# Patient Record
Sex: FEMALE | Race: WHITE
[De-identification: names, ages, dates, MRNs, and addresses within clinical notes are randomized per-mention and may not be internally consistent; named-entity substitution may affect disease eponyms.]

---

## 2017-06-08 ENCOUNTER — HOSPITAL ENCOUNTER (EMERGENCY)
Dept: HOSPITAL 31 - C.ER | Age: 22
Discharge: HOME | End: 2017-06-08
Payer: MEDICAID

## 2017-06-08 VITALS — RESPIRATION RATE: 18 BRPM

## 2017-06-08 VITALS — HEART RATE: 76 BPM | SYSTOLIC BLOOD PRESSURE: 104 MMHG | DIASTOLIC BLOOD PRESSURE: 68 MMHG | TEMPERATURE: 98.3 F

## 2017-06-08 VITALS — OXYGEN SATURATION: 100 %

## 2017-06-08 VITALS — BODY MASS INDEX: 21.2 KG/M2

## 2017-06-08 DIAGNOSIS — R10.13: Primary | ICD-10-CM

## 2017-06-08 LAB
ALBUMIN/GLOB SERPL: 1.1 {RATIO} (ref 1–2.1)
ALP SERPL-CCNC: 54 U/L (ref 38–126)
ALT SERPL-CCNC: 45 U/L (ref 9–52)
AST SERPL-CCNC: 41 U/L (ref 14–36)
BASOPHILS # BLD AUTO: 0 K/UL (ref 0–0.2)
BASOPHILS NFR BLD: 0.5 % (ref 0–2)
BASOPHILS NFR BLD: 1 % (ref 0–2)
BILIRUB SERPL-MCNC: 0.4 MG/DL (ref 0.2–1.3)
BILIRUB UR-MCNC: NEGATIVE MG/DL
BUN SERPL-MCNC: 13 MG/DL (ref 7–17)
CALCIUM SERPL-MCNC: 8.2 MG/DL (ref 8.6–10.4)
CHLORIDE SERPL-SCNC: 102 MMOL/L (ref 98–107)
CO2 SERPL-SCNC: 26 MMOL/L (ref 22–30)
EOSINOPHIL # BLD AUTO: 0.1 K/UL (ref 0–0.7)
EOSINOPHIL NFR BLD: 0.7 % (ref 0–4)
EOSINOPHIL NFR BLD: 1 % (ref 0–4)
ERYTHROCYTE [DISTWIDTH] IN BLOOD BY AUTOMATED COUNT: 17.1 % (ref 11.5–14.5)
GLOBULIN SER-MCNC: 3.4 GM/DL (ref 2.2–3.9)
GLUCOSE SERPL-MCNC: 82 MG/DL (ref 65–105)
GLUCOSE UR STRIP-MCNC: NORMAL MG/DL
HCT VFR BLD CALC: 27.1 % (ref 34–47)
KETONES UR STRIP-MCNC: NEGATIVE MG/DL
LEUKOCYTE ESTERASE UR-ACNC: (no result) LEU/UL
LYMPHOCYTES # BLD AUTO: 0.7 K/UL (ref 1–4.3)
LYMPHOCYTES NFR BLD AUTO: 9.5 % (ref 20–40)
MCH RBC QN AUTO: 20.6 PG (ref 27–31)
MCHC RBC AUTO-ENTMCNC: 30.3 G/DL (ref 33–37)
MCV RBC AUTO: 68.1 FL (ref 81–99)
MONOCYTES # BLD: 1.2 K/UL (ref 0–0.8)
MONOCYTES NFR BLD: 15 % (ref 0–10)
NEUTROPHILS NFR BLD AUTO: 69 % (ref 50–75)
NRBC BLD AUTO-RTO: 0 % (ref 0–2)
PH UR STRIP: 7 [PH] (ref 5–8)
PLATELET # BLD: 292 K/UL (ref 130–400)
PMV BLD AUTO: 9 FL (ref 7.2–11.7)
POTASSIUM SERPL-SCNC: 3.8 MMOL/L (ref 3.6–5.2)
PROT SERPL-MCNC: 7.2 G/DL (ref 6.3–8.3)
PROT UR STRIP-MCNC: NEGATIVE MG/DL
RBC # UR STRIP: NEGATIVE /UL
RBC #/AREA URNS HPF: 3 /HPF (ref 0–3)
SODIUM SERPL-SCNC: 136 MMOL/L (ref 132–148)
SP GR UR STRIP: 1.02 (ref 1–1.03)
TOTAL CELLS COUNTED BLD: 100
UROBILINOGEN UR-MCNC: NORMAL MG/DL (ref 0.2–1)
WBC # BLD AUTO: 7.8 K/UL (ref 4.8–10.8)
WBC #/AREA URNS HPF: 3 /HPF (ref 0–5)

## 2017-06-08 PROCEDURE — 96361 HYDRATE IV INFUSION ADD-ON: CPT

## 2017-06-08 PROCEDURE — 99285 EMERGENCY DEPT VISIT HI MDM: CPT

## 2017-06-08 PROCEDURE — 86850 RBC ANTIBODY SCREEN: CPT

## 2017-06-08 PROCEDURE — 85025 COMPLETE CBC W/AUTO DIFF WBC: CPT

## 2017-06-08 PROCEDURE — 80053 COMPREHEN METABOLIC PANEL: CPT

## 2017-06-08 PROCEDURE — 82550 ASSAY OF CK (CPK): CPT

## 2017-06-08 PROCEDURE — 84484 ASSAY OF TROPONIN QUANT: CPT

## 2017-06-08 PROCEDURE — 71020: CPT

## 2017-06-08 PROCEDURE — 82553 CREATINE MB FRACTION: CPT

## 2017-06-08 PROCEDURE — 96374 THER/PROPH/DIAG INJ IV PUSH: CPT

## 2017-06-08 PROCEDURE — 76705 ECHO EXAM OF ABDOMEN: CPT

## 2017-06-08 PROCEDURE — 86900 BLOOD TYPING SEROLOGIC ABO: CPT

## 2017-06-08 PROCEDURE — 85378 FIBRIN DEGRADE SEMIQUANT: CPT

## 2017-06-08 PROCEDURE — 84703 CHORIONIC GONADOTROPIN ASSAY: CPT

## 2017-06-08 PROCEDURE — 81001 URINALYSIS AUTO W/SCOPE: CPT

## 2017-06-08 NOTE — C.PDOC
History Of Present Illness


21 yr old female presents to the ER stating she recently came back from 

vacation in [place] and has not been feeling well ever since. 

Patient reports of nausea, chest pain,  upper abdominal pain and weakness. 

Patient states she was fine while in [place]. Patient reports 

history of iron deficiency and is taking iron. Denies fever, chills, vomiting, 

diarrhea, constipation, neck pain, headache, weakness or numbness. 


Time Seen by Provider: 17 11:15


Chief Complaint (Nursing): Dizziness/Lightheaded


History Per: Patient


History/Exam Limitations: no limitations


Onset/Duration Of Symptoms: Days (3)





Past Medical History


Reviewed: Historical Data, Nursing Documentation, Vital Signs


Vital Signs: 


 Last Vital Signs











Temp  98.3 F   17 15:56


 


Pulse  76   17 15:56


 


Resp  18   17 15:56


 


BP  104/68   17 15:56


 


Pulse Ox  100   17 15:56














- Medical History


PMH: Anemia, Kidney Stones, Chronic Kidney Disease





- [facility] Procedures








PACKED CELL TRANSFUSION (14)








Family History: States: No Known Family Hx





- Social History


Hx Tobacco Use: No


Hx Alcohol Use: No


Hx Substance Use: No





- Immunization History


Hx Tetanus Toxoid Vaccination: Yes


Hx Influenza Vaccination: Yes


Hx Pneumococcal Vaccination: No





Review Of Systems


Except As Marked, All Systems Reviewed And Found Negative.


Constitutional: Positive for: Weakness.  Negative for: Fever, Chills


Cardiovascular: Positive for: Chest Pain


Gastrointestinal: Positive for: Nausea, Abdominal Pain (Upper abdomin pain ).  

Negative for: Vomiting, Constipation


Musculoskeletal: Negative for: Neck Pain


Neurological: Negative for: Weakness, Numbness, Headache





Physical Exam





- Physical Exam


Appears: Well, Non-toxic, No Acute Distress


Skin: Warm, Dry, No Rash


Head: Atraumatic, Normacephalic


Oral Mucosa: Moist


Chest: Symmetrical, No Tenderness


Cardiovascular: Rhythm Regular, No Murmur


Respiratory: Normal Breath Sounds, No Rales, No Wheezing


Gastrointestinal/Abdominal: Soft, Tenderness (Epigastric tenderness ), No 

Guarding, No Rebound


Extremity: Normal ROM, No Swelling





ED Course And Treatment





- Laboratory Results


Result Diagrams: 


 17 11:58





 17 11:58


ECG: Interpreted By Me, Viewed By Me


ECG Rhythm: Sinus Rhythm


ECG Interpretation: Normal


Rate From EC (BPM)


O2 Sat by Pulse Oximetry: 100 (RA)


Pulse Ox Interpretation: Normal





- Other Rad


  ** CXR


X-Ray: Viewed By Me, Read By Radiologist


Interpretation: HISTORY:  chest pain.  COMPARISON:  Chest x-ray performed .  TECHNIQUE:  Chest PA and lateral.  FINDINGS:  LUNGS:  No focal 

consolidation.  Please note that chest x-ray has limited sensitivity for the 

detection of pulmonary masses.  PLEURA:  No significant pleural effusion 

identified. No definite pneumothorax .  CARDIOVASCULAR:  The cardiomediastinal 

silhouette appears within normal limits of size.  OSSEOUS STRUCTURES:  No acute 

osseous abnormality identified.  VISUALIZED UPPER ABDOMEN:  Unremarkable.  

OTHER FINDINGS:  None.  IMPRESSION:  No focal consolidation, significant 

pleural effusion, or definite pneumothorax identified.





- CT Scan/US


  ** US - Abdomen 


Other Rad Studies (CT/US): Read By Radiologist, Radiology Report Reviewed


CT/US Interpretation: HISTORY:  upper abdominal pain.  COMPARISON:  Abdominal 

ultrasound performed 17.  TECHNIQUE:  Sonographic evaluation of the right 

upper quadrant of the abdomen.  FINDINGS:  LIVER:  Measures 16.9 cm in length 

and appears within normal limits of shape, size, and echotexture.  No focal 

hepatic mass identified. The main portal vein appears patent with normal 

directional flow. No intrahepatic bile duct dilatation.  GALLBLADDER:  No 

gallstones. No gallbladder wall thickening or pericholecystic edema identified. 

Negative sonographic Butcher's sign as assessed by the sonographer.  COMMON BILE 

DUCT:  Measures 3 mm.  PANCREAS:  Not well-visualized.  RIGHT KIDNEY:  Measures 

12.7 x 4.0 x 6.9 cm. No obstructing calculus or hydronephrosis identified.  

AORTA:  Limited visualization appears grossly unremarkable.  IVC:  Limited 

visualization appears grossly unremarkable.  OTHER FINDINGS:  None .  IMPRESSION

:  No acute findings.  See above.





Medical Decision Making


Medical Decision Making: 


PLAN:


* US - Abdomen 


* CXR


* EKG 


* CBC 


* CMP


* D-Dimer


* Troponin 


* HCG 


* Urinalysis 


* Sodium Chloride IV 


* 


* 


* On re-exam patient feels better, tolerates po and is stable to be d/c home 

with PMD follow up. Copies of all labs and radiology reports were given to the 

patient.





Disposition





- Disposition


Disposition: HOME/ ROUTINE


Disposition Time: 15:43


Condition: STABLE


Additional Instructions: 


Follow up with PMD within 1-2 days. Return to ED if feel worse.


Prescriptions: 


Pantoprazole Sodium [Protonix] 40 mg PO QAM #30 ect


Instructions:  Epigastric Pain (ED)





- Clinical Impression


Clinical Impression: 


 Epigastric pain








- PA / NP / Resident Statement


MD/DO has reviewed & agrees with the documentation as recorded.





- Scribe Statement


The provider has reviewed the documentation as recorded by the Scribe


Gabrielle Preston





All medical record entries made by the Scribe were at my direction and 

personally dictated by me. I have reviewed the chart and agree that the record 

accurately reflects my personal performance of the history, physical exam, 

medical decision making, and the department course for this patient. I have 

also personally directed, reviewed, and agree with the discharge instructions 

and disposition.

## 2017-06-08 NOTE — RAD
HISTORY:

chest pain  



COMPARISON:

Chest x-ray performed 2/4/17 



TECHNIQUE:

Chest PA and lateral



FINDINGS:





LUNGS:

No focal consolidation.



Please note that chest x-ray has limited sensitivity for the 

detection of pulmonary masses.



PLEURA:

No significant pleural effusion identified. No definite pneumothorax .



CARDIOVASCULAR:

The cardiomediastinal silhouette appears within normal limits of 

size. 



OSSEOUS STRUCTURES:

 No acute osseous abnormality identified.



VISUALIZED UPPER ABDOMEN:

Unremarkable.



OTHER FINDINGS:

None.



IMPRESSION:

No focal consolidation, significant pleural effusion, or definite 

pneumothorax identified.

## 2017-06-08 NOTE — US
HISTORY:

upper abdominal pain



COMPARISON:

Abdominal ultrasound performed 2/4/17



TECHNIQUE:

Sonographic evaluation of the right upper quadrant of the abdomen.



FINDINGS:



LIVER:

Measures 16.9 cm in length and appears within normal limits of shape, 

size, and echotexture.  No focal hepatic mass identified. The main 

portal vein appears patent with normal directional flow. No 

intrahepatic bile duct dilatation. 



GALLBLADDER:

No gallstones. No gallbladder wall thickening or pericholecystic 

edema identified. Negative sonographic Butcher's sign as assessed by 

the sonographer. 



COMMON BILE DUCT:

Measures 3 mm. 



PANCREAS:

Not well-visualized.



RIGHT KIDNEY:

Measures 12.7 x 4.0 x 6.9 cm. No obstructing calculus or 

hydronephrosis identified. 



AORTA:

Limited visualization appears grossly unremarkable.



IVC:

Limited visualization appears grossly unremarkable.



OTHER FINDINGS:

None .



IMPRESSION:

No acute findings.  See above.

## 2017-06-12 ENCOUNTER — HOSPITAL ENCOUNTER (EMERGENCY)
Dept: HOSPITAL 31 - C.ER | Age: 22
Discharge: HOME | End: 2017-06-12
Payer: MEDICAID

## 2017-06-12 VITALS — BODY MASS INDEX: 22 KG/M2

## 2017-06-12 VITALS
TEMPERATURE: 98.1 F | SYSTOLIC BLOOD PRESSURE: 105 MMHG | OXYGEN SATURATION: 97 % | DIASTOLIC BLOOD PRESSURE: 66 MMHG | HEART RATE: 77 BPM

## 2017-06-12 VITALS — RESPIRATION RATE: 20 BRPM

## 2017-06-12 DIAGNOSIS — R10.13: Primary | ICD-10-CM

## 2017-06-12 DIAGNOSIS — R11.2: ICD-10-CM

## 2017-06-12 DIAGNOSIS — R19.7: ICD-10-CM

## 2017-06-12 LAB
ALBUMIN/GLOB SERPL: 1.1 {RATIO} (ref 1–2.1)
ALP SERPL-CCNC: 50 U/L (ref 38–126)
ALT SERPL-CCNC: 38 U/L (ref 9–52)
AST SERPL-CCNC: 38 U/L (ref 14–36)
BACTERIA #/AREA URNS HPF: (no result) /[HPF]
BASOPHILS # BLD AUTO: 0 K/UL (ref 0–0.2)
BASOPHILS NFR BLD: 0.8 % (ref 0–2)
BILIRUB SERPL-MCNC: 0.4 MG/DL (ref 0.2–1.3)
BILIRUB UR-MCNC: NEGATIVE MG/DL
BUN SERPL-MCNC: 11 MG/DL (ref 7–17)
CALCIUM SERPL-MCNC: 8.6 MG/DL (ref 8.6–10.4)
CHLORIDE SERPL-SCNC: 103 MMOL/L (ref 98–107)
CO2 SERPL-SCNC: 23 MMOL/L (ref 22–30)
EOSINOPHIL # BLD AUTO: 0 K/UL (ref 0–0.7)
EOSINOPHIL NFR BLD: 0.3 % (ref 0–4)
ERYTHROCYTE [DISTWIDTH] IN BLOOD BY AUTOMATED COUNT: 17.1 % (ref 11.5–14.5)
GLOBULIN SER-MCNC: 3.8 GM/DL (ref 2.2–3.9)
GLUCOSE SERPL-MCNC: 76 MG/DL (ref 65–105)
GLUCOSE UR STRIP-MCNC: NORMAL MG/DL
HCT VFR BLD CALC: 30.1 % (ref 34–47)
KETONES UR STRIP-MCNC: NEGATIVE MG/DL
LEUKOCYTE ESTERASE UR-ACNC: (no result) LEU/UL
LYMPHOCYTES # BLD AUTO: 1.2 K/UL (ref 1–4.3)
LYMPHOCYTES NFR BLD AUTO: 21.6 % (ref 20–40)
MCH RBC QN AUTO: 20.6 PG (ref 27–31)
MCHC RBC AUTO-ENTMCNC: 30.2 G/DL (ref 33–37)
MCV RBC AUTO: 68.2 FL (ref 81–99)
MONOCYTES # BLD: 1.2 K/UL (ref 0–0.8)
MONOCYTES NFR BLD: 21.9 % (ref 0–10)
NEUTROPHILS NFR BLD AUTO: 68 % (ref 50–75)
NRBC BLD AUTO-RTO: 0.1 % (ref 0–2)
PH UR STRIP: 6 [PH] (ref 5–8)
PLATELET # BLD: 237 K/UL (ref 130–400)
PMV BLD AUTO: 9.3 FL (ref 7.2–11.7)
POTASSIUM SERPL-SCNC: 4 MMOL/L (ref 3.6–5.2)
PROT SERPL-MCNC: 8 G/DL (ref 6.3–8.3)
PROT UR STRIP-MCNC: (no result) MG/DL
RBC # UR STRIP: (no result) /UL
RBC #/AREA URNS HPF: 11 /HPF (ref 0–3)
SODIUM SERPL-SCNC: 136 MMOL/L (ref 132–148)
SP GR UR STRIP: 1.03 (ref 1–1.03)
TOTAL CELLS COUNTED BLD: 100
UROBILINOGEN UR-MCNC: NORMAL MG/DL (ref 0.2–1)
WBC # BLD AUTO: 5.4 K/UL (ref 4.8–10.8)
WBC #/AREA URNS HPF: < 1 /HPF (ref 0–5)

## 2017-06-12 PROCEDURE — 81001 URINALYSIS AUTO W/SCOPE: CPT

## 2017-06-12 PROCEDURE — 83690 ASSAY OF LIPASE: CPT

## 2017-06-12 PROCEDURE — 80053 COMPREHEN METABOLIC PANEL: CPT

## 2017-06-12 PROCEDURE — 84703 CHORIONIC GONADOTROPIN ASSAY: CPT

## 2017-06-12 PROCEDURE — 96361 HYDRATE IV INFUSION ADD-ON: CPT

## 2017-06-12 PROCEDURE — 96375 TX/PRO/DX INJ NEW DRUG ADDON: CPT

## 2017-06-12 PROCEDURE — 99284 EMERGENCY DEPT VISIT MOD MDM: CPT

## 2017-06-12 PROCEDURE — 85025 COMPLETE CBC W/AUTO DIFF WBC: CPT

## 2017-06-12 PROCEDURE — 87804 INFLUENZA ASSAY W/OPTIC: CPT

## 2017-06-12 PROCEDURE — 96374 THER/PROPH/DIAG INJ IV PUSH: CPT

## 2017-07-08 ENCOUNTER — HOSPITAL ENCOUNTER (OUTPATIENT)
Dept: HOSPITAL 31 - C.ER | Age: 22
Setting detail: OBSERVATION
LOS: 1 days | Discharge: HOME | End: 2017-07-09
Attending: SPECIALIST | Admitting: SPECIALIST
Payer: MEDICAID

## 2017-07-08 VITALS — RESPIRATION RATE: 20 BRPM

## 2017-07-08 VITALS — BODY MASS INDEX: 22 KG/M2

## 2017-07-08 DIAGNOSIS — E03.9: ICD-10-CM

## 2017-07-08 DIAGNOSIS — D64.9: Primary | ICD-10-CM

## 2017-07-08 DIAGNOSIS — R42: ICD-10-CM

## 2017-07-08 DIAGNOSIS — Z87.442: ICD-10-CM

## 2017-07-08 DIAGNOSIS — N18.9: ICD-10-CM

## 2017-07-08 LAB
ALBUMIN SERPL-MCNC: 4 G/DL (ref 3.5–5)
ALBUMIN/GLOB SERPL: 1.1 {RATIO} (ref 1–2.1)
ALT SERPL-CCNC: 20 U/L (ref 9–52)
APTT BLD: 29 SECONDS (ref 21–34)
AST SERPL-CCNC: 23 U/L (ref 14–36)
BASOPHILS # BLD AUTO: 0 K/UL (ref 0–0.2)
BASOPHILS NFR BLD: 0.6 % (ref 0–2)
BILIRUB UR-MCNC: NEGATIVE MG/DL
BUN SERPL-MCNC: 13 MG/DL (ref 7–17)
CALCIUM SERPL-MCNC: 8.9 MG/DL (ref 8.6–10.4)
EOSINOPHIL # BLD AUTO: 0 K/UL (ref 0–0.7)
EOSINOPHIL NFR BLD: 0.5 % (ref 0–4)
ERYTHROCYTE [DISTWIDTH] IN BLOOD BY AUTOMATED COUNT: 17.8 % (ref 11.5–14.5)
GFR NON-AFRICAN AMERICAN: > 60
GLUCOSE UR STRIP-MCNC: NORMAL MG/DL
HCG,QUALITATIVE URINE: NEGATIVE
HGB BLD-MCNC: 8.5 G/DL (ref 11–16)
INR PPP: 1.1
LEUKOCYTE ESTERASE UR-ACNC: (no result) LEU/UL
LYMPHOCYTES # BLD AUTO: 1 K/UL (ref 1–4.3)
LYMPHOCYTES NFR BLD AUTO: 16.4 % (ref 20–40)
MCH RBC QN AUTO: 20.6 PG (ref 27–31)
MCHC RBC AUTO-ENTMCNC: 30.4 G/DL (ref 33–37)
MCV RBC AUTO: 67.8 FL (ref 81–99)
MONOCYTES # BLD: 0.6 K/UL (ref 0–0.8)
MONOCYTES NFR BLD: 9 % (ref 0–10)
NEUTROPHILS # BLD: 4.5 K/UL (ref 1.8–7)
NEUTROPHILS NFR BLD AUTO: 73.5 % (ref 50–75)
NRBC BLD AUTO-RTO: 0 % (ref 0–2)
PH UR STRIP: 5 [PH] (ref 5–8)
PLATELET # BLD: 312 K/UL (ref 130–400)
PMV BLD AUTO: 8.8 FL (ref 7.2–11.7)
PROT UR STRIP-MCNC: NEGATIVE MG/DL
PROTHROMBIN TIME: 12.5 SECONDS (ref 9.7–12.2)
RBC # BLD AUTO: 4.1 MIL/UL (ref 3.8–5.2)
RBC # UR STRIP: (no result) /UL
SP GR UR STRIP: 1.03 (ref 1–1.03)
SQUAMOUS EPITHIAL: 5 /HPF (ref 0–5)
URINE NITRATE: NEGATIVE
UROBILINOGEN UR-MCNC: NORMAL MG/DL (ref 0.2–1)
WBC # BLD AUTO: 6.1 K/UL (ref 4.8–10.8)

## 2017-07-08 PROCEDURE — 36430 TRANSFUSION BLD/BLD COMPNT: CPT

## 2017-07-08 PROCEDURE — 81001 URINALYSIS AUTO W/SCOPE: CPT

## 2017-07-08 PROCEDURE — 76830 TRANSVAGINAL US NON-OB: CPT

## 2017-07-08 PROCEDURE — 86922 COMPATIBILITY TEST ANTIGLOB: CPT

## 2017-07-08 PROCEDURE — 85730 THROMBOPLASTIN TIME PARTIAL: CPT

## 2017-07-08 PROCEDURE — 71010: CPT

## 2017-07-08 PROCEDURE — 85025 COMPLETE CBC W/AUTO DIFF WBC: CPT

## 2017-07-08 PROCEDURE — 80053 COMPREHEN METABOLIC PANEL: CPT

## 2017-07-08 PROCEDURE — 82948 REAGENT STRIP/BLOOD GLUCOSE: CPT

## 2017-07-08 PROCEDURE — 86900 BLOOD TYPING SEROLOGIC ABO: CPT

## 2017-07-08 PROCEDURE — 86920 COMPATIBILITY TEST SPIN: CPT

## 2017-07-08 PROCEDURE — 86850 RBC ANTIBODY SCREEN: CPT

## 2017-07-08 PROCEDURE — 84703 CHORIONIC GONADOTROPIN ASSAY: CPT

## 2017-07-08 PROCEDURE — 99285 EMERGENCY DEPT VISIT HI MDM: CPT

## 2017-07-08 PROCEDURE — 86905 BLOOD TYPING RBC ANTIGENS: CPT

## 2017-07-08 PROCEDURE — 36415 COLL VENOUS BLD VENIPUNCTURE: CPT

## 2017-07-08 PROCEDURE — 80048 BASIC METABOLIC PNL TOTAL CA: CPT

## 2017-07-08 PROCEDURE — 85610 PROTHROMBIN TIME: CPT

## 2017-07-08 PROCEDURE — 76856 US EXAM PELVIC COMPLETE: CPT

## 2017-07-08 NOTE — CP.PCM.HP
History of Present Illness





- History of Present Illness


History of Present Illness: 





21 yr old female with PMHx of hypothyroidism, presents to the ER with 

complaints of dizziness and weakness, mild headache for the past few days. 

Patient reports she also has palpitations Patient denies recent travel, fever, 

vision changes, chest pain, SOB, nausea, vomiting, weakness or numbness. 





Present on Admission





- Present on Admission


History of DVT/PE: No


History of Uncontrolled Diabetes: No


Urinary Catheter: No


Decubitus Ulcer Present: No





Review of Systems





- Constitutional


Constitutional: As Per HPI





- EENT


Eyes: As Per HPI





Past Patient History





- Past Medical History & Family History


Past Medical History?: Yes


Past Family History: Reviewed and not pertinent





- Past Social History


Smoking Status: Never Smoked


Chewing Tobacco Use: No


Alcohol: None


Home Situation {Lives}: With Family





- CARDIAC


Hx Cardiac Disorders: No


Hx Congestive Heart Failure: No





- PULMONARY


Hx Respiratory Disorders: No





- NEUROLOGICAL


Hx Neurological Disorder: No





- HEENT


Hx HEENT Problems: No





- RENAL


Hx Chronic Kidney Disease: No


Hx Dialysis: No


Hx Kidney Stones: No





- ENDOCRINE/METABOLIC


Hx Endocrine Disorders: No





- HEMATOLOGICAL/ONCOLOGICAL


Hx Anemia: Yes





- INTEGUMENTARY


Hx Dermatological Problems: No





- MUSCULOSKELETAL/RHEUMATOLOGICAL


Hx Musculoskeletal Disorders: No


Hx Falls: No





- GASTROINTESTINAL


Hx Gastrointestinal Disorders: No





- GENITOURINARY/GYNECOLOGICAL


Hx Genitourinary Disorders: No





- PSYCHIATRIC


Hx Psychophysiologic Disorder: No


Hx Substance Use: No





- SURGICAL HISTORY


Hx Surgeries: No





- ANESTHESIA


Hx Anesthesia: No


Hx Anesthesia Reactions: No





Meds


Allergies/Adverse Reactions: 


 Allergies











Allergy/AdvReac Type Severity Reaction Status Date / Time


 


aspirin Allergy Severe RASH Verified 06/12/17 17:15


 


sodium ferric gluconate Allergy Severe SWELLING Verified 06/12/17 17:15





complex     





[From Ferrlecit]     


 


sucrose [From Ferrlecit] Allergy Severe SWELLING Verified 06/12/17 17:15


 


morphine AdvReac Severe HEADACHE Verified 06/12/17 17:15


 


metoclopramide [From Reglan] AdvReac Unknown  Verified 07/08/17 12:43














Physical Exam





- Head Exam


Head Exam: ATRAUMATIC, NORMAL INSPECTION, NORMOCEPHALIC





- Eye Exam


Eye Exam: EOMI, Normal appearance, PERRL


Pupil Exam: NORMAL ACCOMODATION, PERRL





Results





- Vital Signs


Recent Vital Signs: 





 Last Vital Signs











Temp  98.3 F   07/08/17 17:22


 


Pulse  65   07/08/17 17:22


 


Resp  18   07/08/17 17:22


 


BP  110/68   07/08/17 17:22


 


Pulse Ox  100   07/08/17 17:22














- Labs


Result Diagrams: 


 07/09/17 07:40





 07/09/17 07:40





Assessment & Plan


(1) Anemia


Assessment and Plan: 


will transfuse 1 unit of PRBC and repeat labs in am 


Status: Acute   





- Date & Time


Date: 07/08/17


Time: 13:00

## 2017-07-08 NOTE — C.PDOC
History Of Present Illness


21 yr old female with PMHx of hypothyroidism, presents to the ER with 

complaints of dizziness as in weakness and mild headache for the past few days. 

Patient reports she was seen in ER last week for mild anemia. Patient also 

reports at times she also has palpitations. Patient denies recent travel, fever

, vision changes, chest pain, SOB, nausea, vomiting, weakness or numbness. 


Time Seen by Provider: 17 13:02


Chief Complaint (Nursing): Dizziness/Lightheaded


History Per: Patient


History/Exam Limitations: no limitations


Onset/Duration Of Symptoms: Days (Few days)





Past Medical History


Reviewed: Historical Data, Nursing Documentation, Vital Signs


Vital Signs: 


 Last Vital Signs











Temp  98.6 F   17 12:38


 


Pulse  71   17 12:38


 


Resp  18   17 12:38


 


BP  124/72   17 12:38


 


Pulse Ox  100   17 14:37














- Medical History


PMH: Anemia, Kidney Stones, Chronic Kidney Disease





- [facility] Procedures








PACKED CELL TRANSFUSION (14)








Family History: States: No Known Family Hx





- Social History


Hx Tobacco Use: No


Hx Alcohol Use: No


Hx Substance Use: No





- Immunization History


Hx Tetanus Toxoid Vaccination: Yes


Hx Influenza Vaccination: Yes


Hx Pneumococcal Vaccination: No





Review Of Systems


Except As Marked, All Systems Reviewed And Found Negative.


Constitutional: Positive for: Weakness.  Negative for: Fever


Eyes: Negative for: Vision Change


Cardiovascular: Positive for: Palpitations (Occasionally ).  Negative for: 

Chest Pain


Gastrointestinal: Negative for: Nausea, Vomiting


Neurological: Positive for: Headache (Mild), Dizziness.  Negative for: Weakness

, Numbness





Physical Exam





- Physical Exam


Appears: Non-toxic, No Acute Distress


Skin: Warm, Dry, Pale, No Rash


Head: Atraumatic, Normacephalic


Eye(s): bilateral: Normal Inspection, PERRL, EOMI


Oral Mucosa: Moist


Chest: Symmetrical, No Tenderness


Cardiovascular: Rhythm Regular, No Murmur


Respiratory: Normal Breath Sounds, No Rales, No Rhonchi, No Stridor, No Wheezing


Gastrointestinal/Abdominal: Normal Exam, Soft, No Tenderness, No Guarding, No 

Rebound


Extremity: Normal ROM, No Swelling


Neurological/Psych: Oriented x3, Normal Speech, Normal Cranial Nerves, Normal 

Motor





ED Course And Treatment





- Laboratory Results


Result Diagrams: 


 17 13:37





 17 13:37


ECG: Interpreted By Me, Viewed By Me


ECG Rhythm: Sinus Rhythm


ECG Interpretation: Normal


Interpretation Of ECG: No ST/T wave changes.


Rate From EC (BPM)


O2 Sat by Pulse Oximetry: 100 (RA)


Pulse Ox Interpretation: Normal





Medical Decision Making


Medical Decision Making: 


r/o anemia, other infectious metabolic etiology





PLAN:


* CXR


* EKG


* CBC


* CMP


* HCG


* Urinalysis 


230: pt with known anemia, now worse than baseline. pt was advised to return to 

er as she is symptomatic. pt c/o of palpitations, dizziness, wekaness. will 

transfuse. dr wen accepts. pt agrees to transfusion. pt denies gi bleeding

, states "she used ot get heavy periods" and has a "family history of anemiaa"





300: dr wen bedside. accepts pt





Disposition





- Disposition


Disposition: HOSPITALIZED


Disposition Time: 14:34


Condition: STABLE





- Clinical Impression


Clinical Impression: 


 Symptomatic anemia








- Scribe Statement


The provider has reviewed the documentation as recorded by the Ludmila Preston


Provider Attestation: 


All medical record entries made by the Isabellaibfrancisco were at my direction and 

personally dictated by me. I have reviewed the chart and agree that the record 

accurately reflects my personal performance of the history, physical exam, 

medical decision making, and the department course for this patient. I have 

also personally directed, reviewed, and agree with the discharge instructions 

and disposition.





Decision To Admit





- Pt Status Changed To:


Hospital Disposition Of: Observation





- .


Bed Request Type: Regular


Admitting Physician: Eulogio Wen


Patient Diagnosis: 


 Symptomatic anemia

## 2017-07-09 VITALS
HEART RATE: 70 BPM | SYSTOLIC BLOOD PRESSURE: 104 MMHG | TEMPERATURE: 98.4 F | DIASTOLIC BLOOD PRESSURE: 63 MMHG | OXYGEN SATURATION: 99 %

## 2017-07-09 LAB
BASOPHILS # BLD AUTO: 0 K/UL (ref 0–0.2)
BASOPHILS NFR BLD: 0.7 % (ref 0–2)
BUN SERPL-MCNC: 14 MG/DL (ref 7–17)
CALCIUM SERPL-MCNC: 8.8 MG/DL (ref 8.6–10.4)
EOSINOPHIL # BLD AUTO: 0.1 K/UL (ref 0–0.7)
EOSINOPHIL NFR BLD: 1.7 % (ref 0–4)
ERYTHROCYTE [DISTWIDTH] IN BLOOD BY AUTOMATED COUNT: 19.4 % (ref 11.5–14.5)
GFR NON-AFRICAN AMERICAN: > 60
HGB BLD-MCNC: 9 G/DL (ref 11–16)
LYMPHOCYTES # BLD AUTO: 1.6 K/UL (ref 1–4.3)
LYMPHOCYTES NFR BLD AUTO: 24.9 % (ref 20–40)
MCH RBC QN AUTO: 22.4 PG (ref 27–31)
MCHC RBC AUTO-ENTMCNC: 31.4 G/DL (ref 33–37)
MCV RBC AUTO: 71.3 FL (ref 81–99)
MONOCYTES # BLD: 0.8 K/UL (ref 0–0.8)
MONOCYTES NFR BLD: 13.4 % (ref 0–10)
NEUTROPHILS # BLD: 3.8 K/UL (ref 1.8–7)
NEUTROPHILS NFR BLD AUTO: 59.3 % (ref 50–75)
NRBC BLD AUTO-RTO: 0 % (ref 0–2)
PLATELET # BLD: 267 K/UL (ref 130–400)
PMV BLD AUTO: 9.3 FL (ref 7.2–11.7)
RBC # BLD AUTO: 4 MIL/UL (ref 3.8–5.2)
WBC # BLD AUTO: 6.3 K/UL (ref 4.8–10.8)

## 2017-07-09 NOTE — US
Pelvic ultrasound 



History: Pelvic pain. 



Comparison: None available. 



Technique: Real-time sonography was performed through the pelvis 

utilizing transabdominal and transvaginal techniques. 



Findings: 



Uterus: 7.3 x 3.9 x 4.3 centimeters.  Heterogeneous echotexture.  

Anteverted. 



Endometrium measures 6 millimeters, within normal limits. 



Small amount of free fluid within the pelvic cul-de-sac. 



Right ovary: 2.9 x 1.8 x 2.6 centimeters. Normal flow. 



Left ovary: 3.1 x 1.2 x 2.4 centimeters. Normal flow. 



Impression: 



Small amount of free fluid within the pelvic cul-de-sac. 



Otherwise unremarkable sonographic evaluation of the pelvis.

## 2017-07-09 NOTE — CP.PCM.PN
Subjective





- Date & Time of Evaluation


Date of Evaluation: 07/09/17


Time of Evaluation: 13:20





- Subjective


Subjective: 





oatient  sen and examined .  Patient denie  SOB CP Palpitation   - dizziness. 





Objective





- Vital Signs/Intake and Output


Vital Signs (last 24 hours): 


 











Temp Pulse Resp BP Pulse Ox


 


 98.4 F   70   20   104/63   99 


 


 07/09/17 00:00  07/09/17 00:00  07/09/17 00:00  07/09/17 00:00  07/09/17 00:00








Intake and Output: 


 











 07/09/17 07/09/17





 06:59 18:59


 


Intake Total 1010 


 


Balance 1010 














- Medications


Medications: 


 Current Medications





Levothyroxine Sodium (Synthroid)  50 mcg PO DAILY@0630 [facility]


   Last Admin: 07/09/17 06:02 Dose:  50 mcg











- Labs


Labs: 


 





 07/09/17 07:40 





 07/09/17 07:40 





 











PT  12.5 SECONDS (9.7-12.2)  H  07/08/17  13:37    


 


INR  1.1   07/08/17  13:37    


 


APTT  29 SECONDS (21-34)   07/08/17  13:37    














- Constitutional


Appears: Well





- Head Exam


Head Exam: NORMAL INSPECTION





- Eye Exam


Eye Exam: Normal appearance





- ENT Exam


ENT Exam: Normal Exam





- Neck Exam


Neck Exam: Full ROM





- Respiratory Exam


Respiratory Exam: Clear to Ausculation Bilateral, NORMAL BREATHING PATTERN





- Cardiovascular Exam


Cardiovascular Exam: REGULAR RHYTHM





- GI/Abdominal Exam


GI & Abdominal Exam: Normal Bowel Sounds





- Rectal Exam


Rectal Exam: Deferred





- Extremities Exam


Extremities Exam: Full ROM





- Back Exam


Back Exam: NORMAL INSPECTION





- Neurological Exam


Neurological Exam: Normal Gait (patient  will be discharged  today FU in office 

in 1 week)





Assessment and Plan


(1) Anemia


Status: Acute

## 2017-07-12 NOTE — CARD
--------------- APPROVED REPORT --------------





EKG Measurement

Heart [id number]

MI [id number]

[id number]

[id number]

[id number]



<Conclusion>

Normal sinus rhythm

Normal ECG

## 2018-03-09 ENCOUNTER — HOSPITAL ENCOUNTER (EMERGENCY)
Dept: HOSPITAL 31 - C.ER | Age: 23
LOS: 1 days | Discharge: HOME | End: 2018-03-10
Payer: MEDICAID

## 2018-03-09 VITALS
OXYGEN SATURATION: 98 % | SYSTOLIC BLOOD PRESSURE: 136 MMHG | HEART RATE: 78 BPM | TEMPERATURE: 98.2 F | DIASTOLIC BLOOD PRESSURE: 69 MMHG | RESPIRATION RATE: 14 BRPM

## 2018-03-09 VITALS — BODY MASS INDEX: 22 KG/M2
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
## 2018-03-09 DIAGNOSIS — S62.600A: Primary | ICD-10-CM

## 2018-03-10 NOTE — C.PDOC
History Of Present Illness


The patient reports that one week ago a door slammed on her finger 1 week ago. 

Patient reports that she is having difficulty moving the finger and constant 

pain which prompted visit. Denies fever, numbness, weakness. 


Time Seen by Provider: 03/09/18 23:42


Chief Complaint (Nursing): Finger,Hand,&Wrist





Past Medical History


Vital Signs: 


 Last Vital Signs











Temp  98.2 F   03/09/18 23:36


 


Pulse  78   03/09/18 23:36


 


Resp  14   03/09/18 23:36


 


BP  136/69   03/09/18 23:36


 


Pulse Ox  98   03/11/18 11:51














- Medical History


PMH: Anemia


   Denies: CHF, Kidney Stones, Chronic Kidney Disease





- [facility] Procedures








PACKED CELL TRANSFUSION (08/01/14)








Family History: States: No Known Family Hx





- Social History


Hx Tobacco Use: No


Hx Alcohol Use: No


Hx Substance Use: No





- Immunization History


Hx Tetanus Toxoid Vaccination: Yes


Hx Influenza Vaccination: Yes


Hx Pneumococcal Vaccination: No





Review Of Systems


Constitutional: Negative for: Fever, Weakness


Musculoskeletal: Positive for: Other (finger pain)


Neurological: Negative for: Weakness, Numbness





Physical Exam





- Physical Exam


Appears: Non-toxic, No Acute Distress


Skin: Normal Color, Warm, No Rash


Head: Atraumatic, Normacephalic


Eye(s): bilateral: Normal Inspection


Oral Mucosa: Moist


Extremity: Capillary Refill (< 2 sec), Other ((+) mild swelling and tenderness 

of the PIP joint of the right index finger. Limited ROM secondary to pain. 

Other fingers and wrists are normal)


Extremity: Bilateral: Normal Color And Temperature


Pulses: Left Radial: Normal, Right Radial: Normal


Neurological/Psych: Oriented x3, Normal Motor, Normal Sensation


Gait: Steady





ED Course And Treatment


O2 Sat by Pulse Oximetry: 98 (on RA)


Pulse Ox Interpretation: Normal





Medical Decision Making


Medical Decision Making: 


Finger xrays shows (+) healing avulsion fx of PIP joint. Finger splint was 

applied by me, finger remains neurovascularly intact. 





Disposition





- Disposition


Referrals: 


Chandrakant Shea MD [Staff Provider] - 


Disposition: HOME/ ROUTINE


Disposition Time: 00:21


Condition: STABLE


Additional Instructions: 


Follow up with the Hand doctor within 1-2 days, Return if worsened, 


Instructions:  Finger Fracture


Forms:  [facility] (English), Work Excuse





- Clinical Impression


Clinical Impression: 


 Finger fracture

## 2018-03-10 NOTE — RAD
PROCEDURE:  Right Index finger radiographs.



HISTORY:

r/o fx



COMPARISON:

None available.



TECHNIQUE:

AP radiograph of the right hand, as well as spot oblique and lateral 

images of index finger were obtained.



FINDINGS:



RIGHT INDEX FINGER:

Tiny ossific density noted at the level of the distal 2nd PIP medial 

aspect likely due to remote injury. No acute displaced fracture 

appreciated. Remainder of the right hand (as seen on the AP view) 

grossly intact.



JOINTS:

No dislocation. 



SOFT TISSUES:

Unremarkable. No evidence of radiopaque foreign body. 



OTHER FINDINGS:

None.



IMPRESSION:

Tiny ossific density noted at the level of the distal 2nd PIP medial 

aspect likely due to remote injury. No acute displaced fracture 

appreciated. Recommend correlation with physical exam

## 2018-04-02 ENCOUNTER — HOSPITAL ENCOUNTER (EMERGENCY)
Dept: HOSPITAL 31 - C.ER | Age: 23
Discharge: HOME | End: 2018-04-02
Payer: MEDICAID

## 2018-04-02 VITALS — BODY MASS INDEX: 22 KG/M2

## 2018-04-02 VITALS
TEMPERATURE: 98 F | RESPIRATION RATE: 18 BRPM | SYSTOLIC BLOOD PRESSURE: 111 MMHG | HEART RATE: 89 BPM | OXYGEN SATURATION: 99 % | DIASTOLIC BLOOD PRESSURE: 68 MMHG

## 2018-04-02 DIAGNOSIS — D64.9: ICD-10-CM

## 2018-04-02 DIAGNOSIS — F41.9: ICD-10-CM

## 2018-04-02 DIAGNOSIS — Z32.02: Primary | ICD-10-CM

## 2018-04-02 LAB
ALBUMIN SERPL-MCNC: 4.2 G/DL (ref 3.5–5)
ALBUMIN/GLOB SERPL: 1 {RATIO} (ref 1–2.1)
ALT SERPL-CCNC: 15 U/L (ref 9–52)
APTT BLD: 28 SECONDS (ref 21–34)
AST SERPL-CCNC: 26 U/L (ref 14–36)
BACTERIA #/AREA URNS HPF: (no result) /[HPF]
BASOPHILS # BLD AUTO: 0.1 K/UL (ref 0–0.2)
BASOPHILS NFR BLD: 0.9 % (ref 0–2)
BILIRUB UR-MCNC: NEGATIVE MG/DL
BNP SERPL-MCNC: 27 PG/ML (ref 0–450)
BUN SERPL-MCNC: 10 MG/DL (ref 7–17)
CALCIUM SERPL-MCNC: 8.9 MG/DL (ref 8.6–10.4)
EOSINOPHIL # BLD AUTO: 0 K/UL (ref 0–0.7)
EOSINOPHIL NFR BLD: 0.6 % (ref 0–4)
ERYTHROCYTE [DISTWIDTH] IN BLOOD BY AUTOMATED COUNT: 17.3 % (ref 11.5–14.5)
GFR NON-AFRICAN AMERICAN: > 60
GLUCOSE UR STRIP-MCNC: NORMAL MG/DL
HCG,QUALITATIVE URINE: NEGATIVE
HGB BLD-MCNC: 8.2 G/DL (ref 11–16)
INR PPP: 1.1
LEUKOCYTE ESTERASE UR-ACNC: (no result) LEU/UL
LYMPHOCYTES # BLD AUTO: 1.2 K/UL (ref 1–4.3)
LYMPHOCYTES NFR BLD AUTO: 18.5 % (ref 20–40)
MCH RBC QN AUTO: 20.4 PG (ref 27–31)
MCHC RBC AUTO-ENTMCNC: 31.2 G/DL (ref 33–37)
MCV RBC AUTO: 65.5 FL (ref 81–99)
MONOCYTES # BLD: 0.5 K/UL (ref 0–0.8)
MONOCYTES NFR BLD: 8.2 % (ref 0–10)
NEUTROPHILS # BLD: 4.6 K/UL (ref 1.8–7)
NEUTROPHILS NFR BLD AUTO: 71.8 % (ref 50–75)
NRBC BLD AUTO-RTO: 0 % (ref 0–2)
PH UR STRIP: 5 [PH] (ref 5–8)
PLATELET # BLD: 117 K/UL (ref 130–400)
PMV BLD AUTO: 8.7 FL (ref 7.2–11.7)
PROT UR STRIP-MCNC: (no result) MG/DL
PROTHROMBIN TIME: 12.6 SECONDS (ref 9.7–12.2)
RBC # BLD AUTO: 4.03 MIL/UL (ref 3.8–5.2)
RBC # UR STRIP: (no result) /UL
SP GR UR STRIP: 1.03 (ref 1–1.03)
SQUAMOUS EPITHIAL: 5 /HPF (ref 0–5)
UROBILINOGEN UR-MCNC: 2 MG/DL (ref 0.2–1)
WBC # BLD AUTO: 6.4 K/UL (ref 4.8–10.8)

## 2018-04-02 NOTE — RAD
PROCEDURE:  Radiographs of the chest and abdomen (obstructive series)



HISTORY:

belly pains  



COMPARISON:

No prior.



TECHNIQUE:

AP radiograph of the chest, with upright and supine radiographs of 

the abdomen.



FINDINGS:



CHEST:

Lungs: Clear.



Cardiovascular: Normal size heart. No pulmonary vascular congestion.



Pleura: No pleural fluid. No pneumothorax.



Other findings: None.



ABDOMEN AND PELVIS:

Bowel: No evidence of bowel obstruction.  Mild retained feces. No 

masses or abnormal calcifications. No hepatic or splenic enlargement.



Free air: None.



Bones:  Unremarkable.



Other findings: None.



IMPRESSION:

Mild retained feces.  Otherwise unremarkable examination.

## 2018-04-02 NOTE — C.PDOC
History Of Present Illness


22 year old female with PMHx of iron deficiency anemia, 46 prior ED visits and 

5 drug allergies presents to the ED requesting a pregnancy test. Patient not 

sure if she is having her period or if she is pregnant. Patient denies 

abdominal pain, significant vaginal bleeding. Patient is currently c/o typical 

anemia symptoms, does not related any significant blood loss. Patient is asking 

for transvaginal US prior to verifying recent pregnancy. Patient is 

argumentative but AOx3. Patient was supposed to go to Dr. Johnson office today 

but came to the ED thinking she will be seen faster. Patient refused chest, 

abdominal exam also refused EKG, CXR. 


Time Seen by Provider: 04/02/18 13:07


Chief Complaint (Nursing): Female Genitourinary


History Per: Patient


History/Exam Limitations: no limitations


Onset/Duration Of Symptoms: Days


Current Symptoms Are (Timing): Still Present


Quality Of Discomfort: "Pain"


Associated Symptoms: denies: Nausea, Vomiting, Diarrhea, Urinary Symptoms


Alleviating Factors: None


Recent travel outside of the [place]: No


Additional History Per: Patient


Abnormal Vaginal Bleeding: No





Past Medical History


Reviewed: Historical Data, Nursing Documentation, Vital Signs


Vital Signs: 


 Last Vital Signs











Temp  98 F   04/02/18 12:48


 


Pulse  89   04/02/18 12:48


 


Resp  18   04/02/18 12:48


 


BP  111/68   04/02/18 12:48


 


Pulse Ox  99   04/02/18 14:31














- Medical History


PMH: Anemia


   Denies: CHF, Kidney Stones, Chronic Kidney Disease


Surgical History: No Surg Hx





- [facility] Procedures








PACKED CELL TRANSFUSION (08/01/14)








Family History: States: Unknown Family Hx





- Social History


Hx Tobacco Use: No


Hx Alcohol Use: No


Hx Substance Use: No





- Immunization History


Hx Tetanus Toxoid Vaccination: Yes


Hx Influenza Vaccination: Yes


Hx Pneumococcal Vaccination: No





Review Of Systems


Constitutional: Negative for: Fever, Chills


Cardiovascular: Negative for: Chest Pain


Respiratory: Negative for: Cough, Shortness of Breath


Gastrointestinal: Negative for: Nausea, Vomiting, Abdominal Pain


Skin: Negative for: Rash


Neurological: Negative for: Weakness, Numbness, Headache, Dizziness





Physical Exam





- Physical Exam


Appears: Non-toxic, No Acute Distress


Skin: Warm, Dry, Pale


Head: Atraumatic, Normacephalic


Eye(s): bilateral: Normal Inspection


Nose: No Discharge


Oral Mucosa: Moist


Neck: Normal ROM, Supple


Respiratory: Normal Breath Sounds, No Rales, No Rhonchi, No Wheezing


Extremity: Normal ROM, No Tenderness, No Swelling


Neurological/Psych: Oriented x3


Gait: Steady





ED Course And Treatment





- Laboratory Results


Result Diagrams: 


 04/02/18 13:26





 04/02/18 13:26


O2 Sat by Pulse Oximetry: 99 (On RA)


Pulse Ox Interpretation: Normal





- Other Rad


  ** Obstructive Series X-Ray


X-Ray: Viewed By Me, Read By Radiologist


Interpretation: PROCEDURE:  Radiographs of the chest and abdomen (obstructive 

series).  HISTORY:  belly pains.  COMPARISON:  No prior.  TECHNIQUE:  AP 

radiograph of the chest, with upright and supine radiographs of the abdomen.  

FINDINGS:  CHEST:  Lungs: Clear.  Cardiovascular: Normal size heart. No 

pulmonary vascular congestion.  Pleura: No pleural fluid. No pneumothorax.  

Other findings: None.  ABDOMEN AND PELVIS:  Bowel: No evidence of bowel 

obstruction.  Mild retained feces. No masses or abnormal calcifications. No 

hepatic or splenic enlargement.  Free air: None.  Bones:  Unremarkable.  Other 

findings: None.  IMPRESSION:  Mild retained feces.  Otherwise unremarkable 

examination.





Medical Decision Making


Medical Decision Making: 


Impression: Positive FOS on belly film 





Patient was argumentative but AOx3, while in the ED is eating a sandwich


Patient's labs were reviewed with her but she refuses any further work up.


Patient states she will follow up with her PMD. 





HGB 8.2 from basreline about 9.0


pt declines further w/u and adm for transfusions





pregnancy test NEGATIVE


no UTI


defer elective pelvic US as no pelvic complaints


pt declines pelvic exam.





opt f/u. 





Disposition


Doctor Will See Patient In The: Office


Counseled Patient/Family Regarding: Studies Performed, Diagnosis





- Disposition


Referrals: 


Eulogio Johnson MD [Staff Provider] - 


Disposition: HOME/ ROUTINE


Disposition Time: 14:00


Condition: GOOD


Additional Instructions: 


hemoglobin 8.2


discuss with Dr. Johnson for further evaluation





Pregnancy test is NEGATIVE


No UTI





outpatient follow-up for further OBGYN concerns.


Instructions:  Pregnancy Tests, Anemia Caused by Low Iron, Adult (DC)


Forms:  [facility] (English)





- Clinical Impression


Clinical Impression: 


 Encounter for pregnancy test, Anxiety, Anemia








- Scribe Statement


The provider has reviewed the documentation as recorded by the Scribe


Conner Caceres





All medical record entries made by the Scribe were at my direction and 

personally dictated by me. I have reviewed the chart and agree that the record 

accurately reflects my personal performance of the history, physical exam, 

medical decision making, and the department course for this patient. I have 

also personally directed, reviewed, and agree with the discharge instructions 

and disposition.

## 2018-10-02 ENCOUNTER — HOSPITAL ENCOUNTER (EMERGENCY)
Dept: HOSPITAL 31 - C.ER | Age: 23
Discharge: HOME | End: 2018-10-02
Payer: COMMERCIAL

## 2018-10-02 VITALS — OXYGEN SATURATION: 100 % | TEMPERATURE: 98.8 F

## 2018-10-02 VITALS — DIASTOLIC BLOOD PRESSURE: 73 MMHG | SYSTOLIC BLOOD PRESSURE: 118 MMHG | HEART RATE: 75 BPM | RESPIRATION RATE: 18 BRPM

## 2018-10-02 VITALS — BODY MASS INDEX: 22 KG/M2

## 2018-10-02 DIAGNOSIS — D64.9: Primary | ICD-10-CM

## 2018-10-02 LAB
ERYTHROCYTE [DISTWIDTH] IN BLOOD BY AUTOMATED COUNT: 24.5 % (ref 11.5–14.5)
HGB BLD-MCNC: 9 G/DL (ref 11–16)
MCH RBC QN AUTO: 22.2 PG (ref 27–31)
MCHC RBC AUTO-ENTMCNC: 32.1 G/DL (ref 33–37)
MCV RBC AUTO: 69 FL (ref 81–99)
PLATELET # BLD: 197 K/UL (ref 130–400)
PMV BLD AUTO: 9 FL (ref 7.2–11.7)
RBC # BLD AUTO: 4.06 MIL/UL (ref 3.8–5.2)
WBC # BLD AUTO: 6.7 K/UL (ref 4.8–10.8)

## 2018-10-02 NOTE — C.PDOC
History Of Present Illness


24 Y/O FEMALE PRESENTS TO ED FOR EVALUATION OF POSSIBLE RECURRENT ANEMIA. 

PATIENT STATES SHE HAS HISTORY OF  CHRONIC IRON DEFICIENCY ANEMIA, COMPLIANT 

WITH IRON SUPPLEMENTS. PATIENT BLOOD TESTED 8/2018 BUT WAS UNREACHABLE DUE TO 

VACATION. SAW PMD 9/27 FOR REPEAT CBC BUT DOESN'T KNOW RESULT. PATIENT ADMITS TO

GENERALIZED WEAKNESS, LIGHTHEADEDNESS "BUT I ALWAYS FEEL THAT WAY". LMP 9/21. NO

ABD PAIN, GI BLEED. HO PRIOR TRANSFUSIONS. PS BASELINE HCT "AROUND 9-10"





EXAM


NONTOXI


HEENT NO PALLOR


ABD NEG


REMAINDER NEG


Time Seen by Provider: 10/02/18 11:16


Chief Complaint (Nursing): Dizziness/Lightheaded


History Per: Patient


History/Exam Limitations: no limitations


Onset/Duration Of Symptoms: Days


Current Symptoms Are (Timing): Still Present





Past Medical History


Reviewed: Historical Data, Nursing Documentation, Vital Signs


Vital Signs: 





                                Last Vital Signs











Temp  98.8 F   10/02/18 11:05


 


Pulse  74   10/02/18 11:05


 


Resp  20   10/02/18 11:05


 


BP  116/77   10/02/18 11:05


 


Pulse Ox  100   10/02/18 11:05














- Medical History


PMH: Anemia


Surgical History: No Surg Hx





- [facility] Procedures











PACKED CELL TRANSFUSION (08/01/14)








Family History: States: No Known Family Hx





- Social History


Hx Tobacco Use: No


Hx Alcohol Use: No


Hx Substance Use: No





- Immunization History


Hx Tetanus Toxoid Vaccination: Yes


Hx Influenza Vaccination: Yes


Hx Pneumococcal Vaccination: No





Review Of Systems


Constitutional: Positive for: Weakness.  Negative for: Fever, Chills


Cardiovascular: Positive for: Light Headedness.  Negative for: Chest Pain


Respiratory: Negative for: Cough, Shortness of Breath


Gastrointestinal: Negative for: Nausea, Vomiting, Abdominal Pain, Hematochezia


Skin: Negative for: Rash





Physical Exam





- Physical Exam


Appears: Non-toxic, No Acute Distress


Skin: Warm, Dry, No Rash


Head: Atraumatic, Normacephalic


Eye(s): bilateral: PERRL, EOMI


Oral Mucosa: Moist


Neck: Normal ROM, Supple


Cardiovascular: Rhythm Regular


Respiratory: Normal Breath Sounds, No Rales, No Rhonchi, No Wheezing


Gastrointestinal/Abdominal: Soft, No Tenderness, No Guarding, No Rebound


Back: No CVA Tenderness


Neurological/Psych: Oriented x3, Normal Speech, Normal Cognition, Normal Motor, 

Normal Sensation





ED Course And Treatment





- Laboratory Results


Result Diagrams: 


                                 10/02/18 11:47





O2 Sat by Pulse Oximetry: 100 (RA)


Pulse Ox Interpretation: Normal





Disposition


Counseled Patient/Family Regarding: Studies Performed, Diagnosis, Need For 

Followup





- Disposition


Referrals: 


YOUR,PMD [Other]


Disposition: HOME/ ROUTINE


Disposition Time: 12:25


Condition: IMPROVED


Instructions:  Anemia Caused by Low Iron, Adult (DC)


Forms:  [facility] (English)





- Clinical Impression


Clinical Impression: 


 Chronic anemia








- Scribe Statement


The provider has reviewed the documentation as recorded by the Scribfrancisco Jasso





All medical record entries made by the Isabellaibfrancisco were at my direction and 

personally dictated by me. I have reviewed the chart and agree that the record 

accurately reflects my personal performance of the history, physical exam, 

medical decision making, and the department course for this patient. I have also

 personally directed, reviewed, and agree with the discharge instructions and 

disposition.

## 2019-06-01 ENCOUNTER — HOSPITAL ENCOUNTER (EMERGENCY)
Dept: HOSPITAL 31 - C.ER | Age: 24
Discharge: HOME | End: 2019-06-01
Payer: COMMERCIAL

## 2019-06-01 VITALS
RESPIRATION RATE: 14 BRPM | DIASTOLIC BLOOD PRESSURE: 56 MMHG | HEART RATE: 100 BPM | TEMPERATURE: 98.6 F | SYSTOLIC BLOOD PRESSURE: 99 MMHG

## 2019-06-01 VITALS — BODY MASS INDEX: 22 KG/M2

## 2019-06-01 VITALS — OXYGEN SATURATION: 100 %

## 2019-06-01 DIAGNOSIS — J11.1: Primary | ICD-10-CM

## 2019-06-01 DIAGNOSIS — B34.9: ICD-10-CM

## 2019-06-01 LAB
ANISOCYTOSIS BLD QL SMEAR: SLIGHT {NULL, NULL}
BACTERIA #/AREA URNS HPF: (no result) {NULL, NULL}
BASOPHILS # BLD AUTO: 0 {NULL, K/UL} (ref 0–0.2)
BASOPHILS NFR BLD: 0.2 {NULL, %} (ref 0–2)
BILIRUB UR-MCNC: NEGATIVE {NULL, NULL}
BUN SERPL-MCNC: 10 {NULL, MG/DL} (ref 7–17)
CALCIUM SERPL-MCNC: 9.1 {NULL, MG/DL} (ref 8.6–10.4)
EOSINOPHIL # BLD AUTO: 0 {NULL, K/UL} (ref 0–0.7)
EOSINOPHIL NFR BLD: 0.2 {NULL, %} (ref 0–4)
ERYTHROCYTE [DISTWIDTH] IN BLOOD BY AUTOMATED COUNT: 16.5 {NULL, %} (ref 11.5–14.5)
GFR NON-AFRICAN AMERICAN: > 60 {NULL, NULL}
GLUCOSE UR STRIP-MCNC: NORMAL {NULL, MG/DL}
HCG,QUALITATIVE URINE: NEGATIVE {NULL, NULL}
HGB BLD-MCNC: 11.5 {NULL, G/DL} (ref 11–16)
HYPOCHROMIC: SLIGHT {NULL, NULL}
LEUKOCYTE ESTERASE UR-ACNC: (no result) {NULL, LEU/UL}
LG PLATELETS BLD QL SMEAR: PRESENT {NULL, NULL}
LYMPHOCYTE: 7 {NULL, %} (ref 20–40)
LYMPHOCYTES # BLD AUTO: 0.6 {NULL, K/UL} (ref 1–4.3)
LYMPHOCYTES NFR BLD AUTO: 6.3 {NULL, %} (ref 20–40)
MCH RBC QN AUTO: 27.9 {NULL, PG} (ref 27–31)
MCHC RBC AUTO-ENTMCNC: 33.6 {NULL, G/DL} (ref 33–37)
MCV RBC AUTO: 83 {NULL, FL} (ref 81–99)
MONOCYTE: 6 {NULL, %} (ref 0–10)
MONOCYTES # BLD: 0.8 {NULL, K/UL} (ref 0–0.8)
MONOCYTES NFR BLD: 8.4 {NULL, %} (ref 0–10)
NEUTROPHILS # BLD: 7.8 {NULL, K/UL} (ref 1.8–7)
NEUTROPHILS NFR BLD AUTO: 84.9 {NULL, %} (ref 50–75)
NEUTROPHILS NFR BLD AUTO: 87 {NULL, %} (ref 50–75)
NRBC BLD AUTO-RTO: 0 {NULL, %} (ref 0–2)
PH UR STRIP: 5 {NULL, NULL} (ref 5–8)
PLATELET # BLD EST: NORMAL {NULL, NULL}
PLATELET # BLD: 185 {NULL, K/UL} (ref 130–400)
PMV BLD AUTO: 9.5 {NULL, FL} (ref 7.2–11.7)
PROT UR STRIP-MCNC: NEGATIVE {NULL, MG/DL}
RBC # BLD AUTO: 4.11 {NULL, MIL/UL} (ref 3.8–5.2)
RBC # UR STRIP: (no result) {NULL, NULL}
SP GR UR STRIP: 1.02 {NULL, NULL} (ref 1–1.03)
SQUAMOUS EPITHIAL: 2 {NULL, /HPF} (ref 0–5)
TOTAL CELLS COUNTED BLD: 100 {NULL, NULL}
UROBILINOGEN UR-MCNC: NORMAL {NULL, MG/DL} (ref 0.2–1)
WBC # BLD AUTO: 9.2 {NULL, K/UL} (ref 4.8–10.8)

## 2019-06-01 PROCEDURE — 80048 BASIC METABOLIC PNL TOTAL CA: CPT

## 2019-06-01 PROCEDURE — 96361 HYDRATE IV INFUSION ADD-ON: CPT

## 2019-06-01 PROCEDURE — 71046 X-RAY EXAM CHEST 2 VIEWS: CPT

## 2019-06-01 PROCEDURE — 96360 HYDRATION IV INFUSION INIT: CPT

## 2019-06-01 PROCEDURE — 81001 URINALYSIS AUTO W/SCOPE: CPT

## 2019-06-01 PROCEDURE — 87804 INFLUENZA ASSAY W/OPTIC: CPT

## 2019-06-01 PROCEDURE — 85025 COMPLETE CBC W/AUTO DIFF WBC: CPT

## 2019-06-01 PROCEDURE — 84703 CHORIONIC GONADOTROPIN ASSAY: CPT

## 2019-06-01 PROCEDURE — 85378 FIBRIN DEGRADE SEMIQUANT: CPT

## 2019-06-01 PROCEDURE — 93005 ELECTROCARDIOGRAM TRACING: CPT

## 2019-06-01 PROCEDURE — 83605 ASSAY OF LACTIC ACID: CPT

## 2019-06-01 PROCEDURE — 99284 EMERGENCY DEPT VISIT MOD MDM: CPT

## 2019-06-01 NOTE — RAD
Chest x-ray two views 



HISTORY:

Cough. 



COMPARISON:

None available. 



FINDINGS:

No focal infiltrate or effusion. 



Small nodular density at the left costophrenic angle may represent 

confluence of shadows with ribs and vessels. 



Bibasilar breast and nipple shadows. 



Heart size within normal limits. 



Impression:



No focal infiltrate or effusion.

## 2019-06-03 NOTE — CARD
--------------- APPROVED REPORT --------------





Date of service: 06/01/2019



EKG Measurement

Heart [id number]

[id number]

[id number]

[id number]

[id number]



<Conclusion>

Sinus tachycardia

Otherwise normal ECG

## 2024-10-21 NOTE — C.PDOC
Time Seen by Provider: 06/12/17 18:58


Chief Complaint (Nursing): Abdominal Pain


History Per: Patient


Onset/Duration Of Symptoms: Days (2)


Current Symptoms Are (Timing): Still Present


Severity: Moderate


Location Of Pain/Discomfort: Epigastric


Quality Of Discomfort: Unable To Describe


Associated Symptoms: Nausea, Vomiting, Diarrhea


Exacerbating Factors: Food


Alleviating Factors: None


Last Bowel Movement: Today


Recent travel outside of the [place]: Yes (to [place])


Additional History Per: Prior Records





Past Medical History


Reviewed: Historical Data, Nursing Documentation, Vital Signs


Vital Signs: 





 Last Vital Signs











Temp  98.5 F   06/12/17 17:15


 


Pulse  95 H  06/12/17 17:15


 


Resp  20   06/12/17 17:15


 


BP  120/75   06/12/17 17:15


 


Pulse Ox  100   06/12/17 17:15














- Medical History


PMH: Anemia, Kidney Stones


Surgical History: No Surg Hx





- [facility] Procedures











PACKED CELL TRANSFUSION (08/01/14)








Family History: States: Unknown Family Hx





- Social History


Hx Tobacco Use: No


Hx Alcohol Use: No


Hx Substance Use: No





- Immunization History


Hx Tetanus Toxoid Vaccination: Yes


Hx Influenza Vaccination: Yes


Hx Pneumococcal Vaccination: No





Review Of Systems


Except As Marked, All Systems Reviewed And Found Negative.


Constitutional: Negative for: Fever, Weakness


ENT: Positive for: Nose Congestion.  Negative for: Throat Pain


Cardiovascular: Negative for: Chest Pain


Respiratory: Positive for: Cough.  Negative for: Shortness of Breath


Gastrointestinal: Positive for: Nausea, Vomiting, Abdominal Pain, Diarrhea.  

Negative for: Melena, Hematochezia, Hematemesis


Genitourinary: Negative for: Dysuria


Musculoskeletal: Negative for: Neck Pain


Skin: Negative for: Rash


Neurological: Negative for: Weakness, Numbness, Seizures, Altered Mental Status

, Headache





Physical Exam





- Physical Exam


Appears: Non-toxic, No Acute Distress


Skin: Normal Color, Warm, Dry, No Rash


Head: Atraumatic, Normacephalic


Eye(s): bilateral: PERRL, EOMI


Neck: Normal ROM, Supple


Cardiovascular: Rhythm Regular


Respiratory: Normal Breath Sounds, No Accessory Muscle Use


Gastrointestinal/Abdominal: Soft, Tenderness (mild epigastric), No Distention, 

No Guarding, No Rebound


Back: No CVA Tenderness


Extremity: Normal ROM


Neurological/Psych: Oriented x3, Normal Motor, Normal Sensation





ED Course And Treatment





- Laboratory Results


Result Diagrams: 


 06/12/17 18:53





 06/12/17 18:53


Lab Interpretation: No Changes Compared To Prior Results


Urine Pregnancy POC: Negative


O2 Sat by Pulse Oximetry: 100


Pulse Ox Interpretation: Normal


Progress Note: Pt feels much better and wants to go home. No abdominal pain or 

tenderness.


Reassessment Condition: Improved





Progress





- Interventions


Interventions:: Observation, Intravenous fluid





- Medications Administered


Intravenous: Antiemetic, H-2 blocker





- Data Reviewed


Data Reviewed: Lab, Old records





- Patient Status


Patient status: Mostly improved





- Continuity of Care


Discussed patient case with:: Patient, Family-HIPPA compliant, ED Nurse





- Patient Plan


Patient Plan: Discharge, F/U with PCP, Continue present meds





Disposition


Counseled Patient/Family Regarding: Studies Performed, Diagnosis, Need For 

Followup, Rx Given





- Disposition


Referrals: 


Eulogio Johnson MD [Staff Provider] - 


Disposition: HOME/ ROUTINE


Disposition Time: 20:01


Condition: IMPROVED


Additional Instructions: 


Follow up with your doctor. Drink plenty of fluids. Return to the ER if you 

develop fever, not tolerating fluids, worsening of symptoms or if you have any 

other concerns. 


Prescriptions: 


Metoclopramide [Reglan] 1 tab PO TID PRN #15 tab


 PRN Reason: Nausea/Vomiting


Instructions:  Gastroenteritis (ED)


Print Language: Guyanese





- Clinical Impression


Clinical Impression: 


 Nausea vomiting and diarrhea, Abdominal pain No